# Patient Record
Sex: MALE | Race: WHITE | Employment: UNEMPLOYED | URBAN - METROPOLITAN AREA
[De-identification: names, ages, dates, MRNs, and addresses within clinical notes are randomized per-mention and may not be internally consistent; named-entity substitution may affect disease eponyms.]

---
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
## 2022-01-05 ENCOUNTER — HOSPITAL ENCOUNTER (OUTPATIENT)
Dept: SPEECH THERAPY | Facility: CLINIC | Age: 22
Setting detail: THERAPIES SERIES
Discharge: HOME | End: 2022-01-05
Payer: MEDICARE

## 2022-01-05 PROCEDURE — 92507 TX SP LANG VOICE COMM INDIV: CPT

## 2022-01-05 NOTE — VIRTUAL HEALTH
[id number] THERAPY  SPEECH THERAPY  DAILY TREATMENT NOTE    Date: 2022  Patients Name:  Libby Blake  YOB: 2000 (25 y.o.)  Gender:  male  MRN:  [id number]  Account #: [de-identified]  [facility]#: [id number]  Diagnosis: Autism F84.0  Rehab Diagnosis/Code: Language disorder NOS F80.9  Referring Provider: Gwen Elliott MD      INSURANCE  Insurance Information: [place] [employer]  Total number of visits approved: 30   Total number of visits to date:   (virtual)      Primary Language: English     Allergies: _x_yes ___ no ___NA  Type of Allergies: Seasonal     Medical Precautions: NA  If medical precautions listed, then were precautions addressed ___ yes ___no __x_NA    PAIN  [x]? No     []? Yes         Location:  N/A  Pain Rating (0-10 pain scale): N/A  Pain Description: N/A    SUBJECTIVE  Patient presents to virtual video visit with mother present. SLP in clinic. Family concerns/observations:  No new reports. GOALS/ TREATMENT SESSION:  *Goals updated from POC*  1. Patient/Caregiver will be independent with home exercise program- Ongoing  2. Pt will \"wh\" questions (novel and biographical) in 4/5x session given min cues. \"Where\" 1x given mod cues.  3/5x given min cues, increasing to 4/5x given mod cues   Age 1/5x given min cues, increasing to 5/5x given mod cues     \"What\" re: object function 3/5x given min cues, increasing to 5/5x given mod cues. 3. Pt will  label at least 2 attributes (category, function, description) for a given object in 4/5x session given min cues.    Category- 5/10x given min cues, increasing to 10/10x given mod cues   Function- 5/5x given min cues. 4. Pt will use complete sentence (pronoun, verb tense, location) to describe a picture and/or comment in 4/5x given min cues   Pt using complete sentence to answer questions 3/5x given min cues.         EDUCATION  Education provided to patient/family/caregiver:      [x]Yes/New education    [x]Yes/Continued Review of prior education   __No  If yes Education Provided: Discussed progress with  and age     Method of Education:     [x]Discussion     [x]Demonstration    [] Written     []Other  Evaluation of Patients Response to Education:         [x]Patient and or caregiver verbalized understanding  [x]Patient and or Caregiver Demonstrated without assistance   []Patient and or Caregiver Demonstrated with assistance  []Needs additional instruction to demonstrate understanding of education    ASSESSMENT  Patient tolerated todays treatment session:    [x] Good   []  Fair   []  Poor  Limitations/difficulties with treatment session due to:   []Pain     []Fatigue     []Other medical complications     []Other  Goal Assessment: [] No Change    [x]Improved  Comments: If improved/see goal: 2-4    PLAN  [x]Continue with current plan of care  [][facility]  []IHold per patient request  [] Change Treatment plan:  [] Insurance hold  __ Other    Skilled care is justified for Speech therapy to improve:  _x_ receptive language skills  _x_ expressive language skills  __ pt's ability to produce speech sounds  __ pt's ability to safely/efficiently swallow foods/liquids  __ other:     TIME   Time Treatment session was INITIATED 935am   Time Treatment session was STOPPED 10am       Total TIMED minutes 25 min    Total UNTIMED minutes 0   Total TREATMENT minutes 25 min     Charges: [id number]    Job Jiang is a 25 y.o. male being seen by a Virtual Visit (video visit) encounter to address concerns as mentioned above. A caregiver was present when appropriate.   Pursuant to the emergency declaration under the [facility], [address] [place] [address] authority and the [employer] and [employer] General Act, this Virtual Visit was conducted with patient's (and/or legal guardian's) consent, to reduce the patient's risk of exposure to COVID-19 and provide necessary medical care. The patient (and/or legal guardian) has also been advised to contact this office for worsening conditions or problems, and seek emergency medical treatment and/or call 911 if deemed necessary. Services were provided through a video synchronous discussion virtually to substitute for in-person clinic visit. Patient was located at their individual home and provider was located at the clinic. --Alexandrea White SLP on 1/5/2022 at 7:42 AM    An electronic signature was used to authenticate this note.          Electronically signed by:   Alexandrea White M.A., [address] [facility] [place]             Date:1/5/2022

## 2022-01-12 ENCOUNTER — HOSPITAL ENCOUNTER (OUTPATIENT)
Dept: SPEECH THERAPY | Facility: CLINIC | Age: 22
Setting detail: THERAPIES SERIES
Discharge: HOME | End: 2022-01-12
Payer: MEDICARE

## 2022-01-12 PROCEDURE — 92507 TX SP LANG VOICE COMM INDIV: CPT

## 2022-01-19 ENCOUNTER — HOSPITAL ENCOUNTER (OUTPATIENT)
Dept: SPEECH THERAPY | Facility: CLINIC | Age: 22
Setting detail: THERAPIES SERIES
Discharge: HOME | End: 2022-01-19
Payer: MEDICARE

## 2022-01-19 PROCEDURE — 92507 TX SP LANG VOICE COMM INDIV: CPT

## 2022-01-19 NOTE — VIRTUAL HEALTH
[id number] THERAPY  SPEECH THERAPY  DAILY TREATMENT NOTE    Date: 2022  Patients Name:  Mariella Stanton  YOB: 2000 (25 y.o.)  Gender:  male  MRN:  [id number]  Account #: [de-identified]  [facility]#: [id number]  Diagnosis: Autism F84.0  Rehab Diagnosis/Code: Language disorder NOS F80.9  Referring Provider: Drinda Phalen, MD      INSURANCE  Insurance Information: [place] [employer]age  Total number of visits approved: 30   Total number of visits to date:  3/30 (virtual)      Primary Language: English     Allergies: _x_yes ___ no ___NA  Type of Allergies: Seasonal     Medical Precautions: NA  If medical precautions listed, then were precautions addressed ___ yes ___no __x_NA    PAIN  [x]? No     []? Yes         Location:  N/A  Pain Rating (0-10 pain scale): N/A  Pain Description: N/A    SUBJECTIVE  Patient presents to virtual video visit with mother present. SLP in clinic. Family concerns/observations: Mother reports pt is improving in Brain Balance exercises. GOALS/ TREATMENT SESSION:  1. Patient/Caregiver will be independent with home exercise program- Ongoing  2. Pt will \"wh\" questions (novel and biographical) in 4/5x session given min cues. \"Where\" 4/10x given min cues, increasing to 8/10x given mod cues      2/3x given min cues, increasing to 3/3x given mod cues    Age 3/3x given min cues    \"What\" 5/10x given min cues, increasing to 10/10x given mod cues     3. Pt will  label at least 2 attributes (category, function, description) for a given object in 4/5x session given min cues.    Category-5/5x given direct model   Function- 5/10x given min cues, increasing to 5/10x given  choice of two    4. Pt will use complete sentence (pronoun, verb tense, location) to describe a picture and/or comment in 4/5x given min cues   Pt using complete to describe 2/10x given min cues, increasing to 10/10x given mod cues (visual sentence strip, expansion for locations). EDUCATION  Education provided to patient/family/caregiver:      [x]Yes/New education    [x]Yes/Continued Review of prior education   __No  If yes Education Provided: Reviewed progress father; student clinician      Method of Education:     [x]Discussion     [x]Demonstration    [] Written     []Other  Evaluation of Patients Response to Education:         [x]Patient and or caregiver verbalized understanding  [x]Patient and or Caregiver Demonstrated without assistance   []Patient and or Caregiver Demonstrated with assistance  []Needs additional instruction to demonstrate understanding of education    ASSESSMENT  Patient tolerated todays treatment session:    [x] Good   []  Fair   []  Poor  Limitations/difficulties with treatment session due to:   []Pain     []Fatigue     []Other medical complications     []Other  Goal Assessment: [] No Change    [x]Improved  Comments: If improved/see goal: 2-4    PLAN  [x]Continue with current plan of care  [][facility]  []IHold per patient request  [] Change Treatment plan:  [] Insurance hold  __ Other    Skilled care is justified for Speech therapy to improve:  _x_ receptive language skills  _x_ expressive language skills  __ pt's ability to produce speech sounds  __ pt's ability to safely/efficiently swallow foods/liquids  __ other:     TIME   Time Treatment session was INITIATED 930am   Time Treatment session was STOPPED 958am       Total TIMED minutes 28 min    Total UNTIMED minutes 0   Total TREATMENT minutes 28 min     Charges: [id number]    Alba Gilbert is a 25 y.o. male being seen by a Virtual Visit (video visit) encounter to address concerns as mentioned above. A caregiver was present when appropriate.   Pursuant to the emergency declaration under the [address], [address] waiver authority and the [employer] and [employer] Act, this Virtual Visit was conducted with patient's (and/or legal guardian's) consent, to reduce the patient's risk of exposure to COVID-19 and provide necessary medical care. The patient (and/or legal guardian) has also been advised to contact this office for worsening conditions or problems, and seek emergency medical treatment and/or call 911 if deemed necessary. Services were provided through a video synchronous discussion virtually to substitute for in-person clinic visit. Patient was located at their individual home and provider was located at the clinic. --DAVE Camarena on 1/19/2022 at 7:44 AM    An electronic signature was used to authenticate this note.          Electronically signed by:   Rufino Kern M.A., [address] [place] [address]             Date:1/19/2022

## 2022-01-26 ENCOUNTER — HOSPITAL ENCOUNTER (OUTPATIENT)
Dept: SPEECH THERAPY | Facility: CLINIC | Age: 22
Setting detail: THERAPIES SERIES
Discharge: HOME | End: 2022-01-26
Payer: MEDICARE

## 2022-01-26 PROCEDURE — 92507 TX SP LANG VOICE COMM INDIV: CPT

## 2022-01-26 NOTE — VIRTUAL HEALTH
[address] THERAPY  SPEECH THERAPY  DAILY TREATMENT NOTE    Date: 2022  Patients Name:  Maryann Dupree  YOB: 2000 (25 y.o.)  Gender:  male  MRN:  [id number]  Account #: [de-identified]  CSN#: [id number]  Diagnosis: Autism F84.0  Rehab Diagnosis/Code: Language disorder NOS F80.9  Referring Provider: Oscar Campbell MD      INSURANCE  Insurance Information: [place] [employer]age  Total number of visits approved: 30   Total number of visits to date:   (virtual)      Primary Language: English     Allergies: _x_yes ___ no ___NA  Type of Allergies: Seasonal     Medical Precautions: NA  If medical precautions listed, then were precautions addressed ___ yes ___no __x_NA    PAIN  [x]? No     []? Yes         Location:  N/A  Pain Rating (0-10 pain scale): N/A  Pain Description: N/A    SUBJECTIVE  Patient presents to virtual video visit with father present. SLP in clinic. Family concerns/observations:  Father reports is using new words that typically doesn't use. GOALS/ TREATMENT SESSION:  1. Patient/Caregiver will be independent with home exercise program- Ongoing  2. Pt will \"wh\" questions (novel and biographical) in 4/5x session given min cues.   \"Where\" 2/5x given min cues, increasing to 5/5x given mod cues      2/2x given min cues  Age 2/2x given min cues    \"What\"  Re: to object function 3/10x given min cues, increasing to 10/10x given mod cues     3. Pt will  label at least 2 attributes (category, function, description) for a given object in 4/5x session given min cues.    Category-5/10x given min cues, increasing to 10/10x given mod cues  Function- 3/10x given min cues, increasing to 5/10x given  choice of two    4. Pt will use complete sentence (pronoun, verb tense, location) to describe a picture and/or comment in 4/5x given min cues   Pt using complete to describe 5/10x given min cues, increasing to 10/10x given mod cues (visual sentence strip, expansion for locations). EDUCATION  Education provided to patient/family/caregiver:      [x]Yes/New education    [x]Yes/Continued Review of prior education   __No  If yes Education Provided: Reviewed session with mother      Method of Education:     [x]Discussion     [x]Demonstration    [] Written     []Other  Evaluation of Patients Response to Education:         [x]Patient and or caregiver verbalized understanding  [x]Patient and or Caregiver Demonstrated without assistance   []Patient and or Caregiver Demonstrated with assistance  []Needs additional instruction to demonstrate understanding of education    ASSESSMENT  Patient tolerated todays treatment session:    [x] Good   []  Fair   []  Poor  Limitations/difficulties with treatment session due to:   []Pain     []Fatigue     []Other medical complications     []Other  Goal Assessment: [] No Change    [x]Improved  Comments: If improved/see goal: 2-4    PLAN  [x]Continue with current plan of care  [][facility]  []IHold per patient request  [] Change Treatment plan:  [] Insurance hold  __ Other    Skilled care is justified for Speech therapy to improve:  _x_ receptive language skills  _x_ expressive language skills  __ pt's ability to produce speech sounds  __ pt's ability to safely/efficiently swallow foods/liquids  __ other:     TIME   Time Treatment session was INITIATED 930am   Time Treatment session was STOPPED 10am       Total TIMED minutes 30 min    Total UNTIMED minutes 0   Total TREATMENT minutes 30 min     Charges: [id number]    Cee Rodriguez is a 25 y.o. male being seen by a Virtual Visit (video visit) encounter to address concerns as mentioned above. A caregiver was present when appropriate.   Pursuant to the emergency declaration under the [facility] [address] waiver authority and the [employer] and [employer] General Act, this Virtual Visit was conducted with patient's (and/or legal guardian's) consent, to reduce the patient's risk of exposure to COVID-19 and provide necessary medical care. The patient (and/or legal guardian) has also been advised to contact this office for worsening conditions or problems, and seek emergency medical treatment and/or call 911 if deemed necessary. Services were provided through a video synchronous discussion virtually to substitute for in-person clinic visit. Patient was located at their individual home and provider was located at the clinic. --DAVE Dent on 1/26/2022 at 8:00 AM    An electronic signature was used to authenticate this note.          Electronically signed by:   Nicole Buchanan M.A., [address] [place] [address]             Date:1/26/2022

## 2022-02-02 ENCOUNTER — HOSPITAL ENCOUNTER (OUTPATIENT)
Dept: SPEECH THERAPY | Facility: CLINIC | Age: 22
Setting detail: THERAPIES SERIES
Discharge: HOME | End: 2022-02-02
Payer: MEDICARE

## 2022-02-02 PROCEDURE — 92507 TX SP LANG VOICE COMM INDIV: CPT

## 2022-02-02 NOTE — VIRTUAL HEALTH
[address] THERAPY  SPEECH THERAPY  DAILY TREATMENT NOTE    Date: 2022  Patients Name:  Elizabeth Zheng  YOB: 2000 (25 y.o.)  Gender:  male  MRN:  [id number]  Account #: [de-identified]  [facility]#: [id number]  Diagnosis: Autism F84.0  Rehab Diagnosis/Code: Language disorder NOS F80.9  Referring Provider: Cj Coyle MD      INSURANCE  Insurance Information: [employer]  Total number of visits approved: 30   Total number of visits to date:   (virtual)      Primary Language: English     Allergies: _x_yes ___ no ___NA  Type of Allergies: Seasonal     Medical Precautions: NA  If medical precautions listed, then were precautions addressed ___ yes ___no __x_NA    PAIN  [x]? No     []? Yes         Location:  N/A  Pain Rating (0-10 pain scale): N/A  Pain Description: N/A    SUBJECTIVE  Patient presents to virtual video visit with mother present. SLP in clinic. Family concerns/observations:  No new reports. GOALS/ TREATMENT SESSION:  1. Patient/Caregiver will be independent with home exercise program- Ongoing  2. Pt will \"wh\" questions (novel and biographical) in 4/5x session given min cues. \"Where\" 1/10x given min cues, increasing to 10/10x given mod cues      3/3x given min cues  Age 3/3x given min cues    \"What\"  Re: to object function 8/10x given min cues, increasing to 10/10x given mod cues     3. Pt will  label at least 2 attributes (category, function, description) for a given object in 4/5x session given min cues.    Category-2/10x given min cues, increasing to 8/10x given mod cues  Function- 8/10x given min cues, increasing to 10/10x given  choice of two    4. Pt will use complete sentence (pronoun, verb tense, location) to describe a picture and/or comment in 4/5x given min cues   Pt using complete to describe 4/10x given min cues, increasing to 10/10x given mod cues (visual sentence strip, expansion for locations).         EDUCATION  Education provided to patient/family/caregiver:      [x]Yes/New education    [x]Yes/Continued Review of prior education   __No  If yes Education Provided: Reviewed session with father      Method of Education:     [x]Discussion     [x]Demonstration    [] Written     []Other  Evaluation of Patients Response to Education:         [x]Patient and or caregiver verbalized understanding  [x]Patient and or Caregiver Demonstrated without assistance   []Patient and or Caregiver Demonstrated with assistance  []Needs additional instruction to demonstrate understanding of education    ASSESSMENT  Patient tolerated todays treatment session:    [x] Good   []  Fair   []  Poor  Limitations/difficulties with treatment session due to:   []Pain     []Fatigue     []Other medical complications     []Other  Goal Assessment: [] No Change    [x]Improved  Comments: If improved/see goal: 2-4    PLAN  [x]Continue with current plan of care  [][facility]  []IHold per patient request  [] Change Treatment plan:  [] Insurance hold  __ Other    Skilled care is justified for Speech therapy to improve:  _x_ receptive language skills  _x_ expressive language skills  __ pt's ability to produce speech sounds  __ pt's ability to safely/efficiently swallow foods/liquids  __ other:     TIME   Time Treatment session was INITIATED 930am   Time Treatment session was STOPPED 10am       Total TIMED minutes 30 min    Total UNTIMED minutes 0   Total TREATMENT minutes 30 min     Charges: [id number]    Fernando Cedillo is a 25 y.o. male being seen by a Virtual Visit (video visit) encounter to address concerns as mentioned above. A caregiver was present when appropriate.   Pursuant to the emergency declaration under the [facility], [address] waiver authority and the [employer] and [employer] Act, this Virtual Visit was conducted with patient's (and/or legal guardian's) consent, to reduce the patient's risk of exposure to COVID-19 and provide necessary medical care. The patient (and/or legal guardian) has also been advised to contact this office for worsening conditions or problems, and seek emergency medical treatment and/or call 911 if deemed necessary. Services were provided through a video synchronous discussion virtually to substitute for in-person clinic visit. Patient was located at their individual home and provider was located at the clinic. --DAVE Dominguez on 2/2/2022 at 7:34 AM    An electronic signature was used to authenticate this note.          Electronically signed by:   Sherman Moreira M.A., [address] [facility]             Date:2/2/2022

## 2022-02-09 ENCOUNTER — HOSPITAL ENCOUNTER (OUTPATIENT)
Dept: SPEECH THERAPY | Facility: CLINIC | Age: 22
Setting detail: THERAPIES SERIES
Discharge: HOME | End: 2022-02-09
Payer: MEDICARE

## 2022-02-09 PROCEDURE — 92507 TX SP LANG VOICE COMM INDIV: CPT

## 2022-02-09 NOTE — VIRTUAL HEALTH
[address] THERAPY  SPEECH THERAPY  DAILY TREATMENT NOTE    Date: 2022  Patients Name:  Mariella Stanton  YOB: 2000 (25 y.o.)  Gender:  male  MRN:  [id number]  Account #: [de-identified]  [facility]#: [id number]  Diagnosis: Autism F84.0  Rehab Diagnosis/Code: Language disorder NOS F80.9  Referring Provider: Drinda Phalen, MD      INSURANCE  Insurance Information: [employer]  Total number of visits approved: 30   Total number of visits to date:   (virtual)      Primary Language: English     Allergies: _x_yes ___ no ___NA  Type of Allergies: Seasonal     Medical Precautions: NA  If medical precautions listed, then were precautions addressed ___ yes ___no __x_NA    PAIN  [x]? No     []? Yes         Location:  N/A  Pain Rating (0-10 pain scale): N/A  Pain Description: N/A    SUBJECTIVE  Patient presents to virtual video visit with mother present. SLP in clinic. Student clinician in session. Family concerns/observations: Mother has no new reports. GOALS/ TREATMENT SESSION:  1. Patient/Caregiver will be independent with home exercise program- Ongoing  2. Pt will \"wh\" questions (novel and biographical) in 4/5x session given min cues. \"Where\" 4/5x given mod cues.  4/4x given min cues  Age 4/4x given min cues  Address  4/4x given direct model (visual and verbal)      3. Pt will  label at least 2 attributes (category, function, description) for a given object in 4/5x session given min cues.    Category- 6/10x given min cues, increasing to 10/10x given mod cues (fruit, vegetable, food, drink)     4. Pt will use complete sentence (pronoun, verb tense, location) to describe a picture and/or comment in 4/5x given min cues   Pt using complete to describe 1/10x given min cues, increasing to 10/10x given mod cues (visual sentence strip, expansion for locations).    *Difficulty with pronouns this session; increase in use of action words and expanding sentences with objects and locations     Pt responding to questions re: to biographical information (, age) using a complete sentence 2/6x given verbal prompt, increasing to 6/6x given model and visual sentence strip         EDUCATION  Education provided to patient/family/caregiver:      [x]Yes/New education    [x]Yes/Continued Review of prior education   __No  If yes Education Provided: Reviewed session with mother     Method of Education:     [x]Discussion     [x]Demonstration    [] Written     []Other  Evaluation of Patients Response to Education:         [x]Patient and or caregiver verbalized understanding  [x]Patient and or Caregiver Demonstrated without assistance   []Patient and or Caregiver Demonstrated with assistance  []Needs additional instruction to demonstrate understanding of education    ASSESSMENT  Patient tolerated todays treatment session:    [x] Good   []  Fair   []  Poor  Limitations/difficulties with treatment session due to:   []Pain     []Fatigue     []Other medical complications     []Other  Goal Assessment: [] No Change    [x]Improved  Comments: If improved/see goal: 2-4    PLAN  [x]Continue with current plan of care  [][facility]  []IHold per patient request  [] Change Treatment plan:  [] Insurance hold  __ Other    Skilled care is justified for Speech therapy to improve:  _x_ receptive language skills  _x_ expressive language skills  __ pt's ability to produce speech sounds  __ pt's ability to safely/efficiently swallow foods/liquids  __ other:     TIME   Time Treatment session was INITIATED 938am   Time Treatment session was STOPPED 1008am       Total TIMED minutes 30 min    Total UNTIMED minutes 0   Total TREATMENT minutes 30 min     Charges: [id number]    Andree Chan is a 25 y.o. male being seen by a Virtual Visit (video visit) encounter to address concerns as mentioned above. A caregiver was present when appropriate.   Pursuant to the emergency declaration under the [address] and the [employer] Act, [facility] waiver authority and the Coronavirus Preparedness and [employer] Act, this Virtual Visit was conducted with patient's (and/or legal guardian's) consent, to reduce the patient's risk of exposure to COVID-19 and provide necessary medical care. The patient (and/or legal guardian) has also been advised to contact this office for worsening conditions or problems, and seek emergency medical treatment and/or call 911 if deemed necessary. Services were provided through a video synchronous discussion virtually to substitute for in-person clinic visit. Patient was located at their individual home and provider was located at the clinic. --DAVE Calvillo on 2/9/2022 at 9:16 AM    An electronic signature was used to authenticate this note.          Electronically signed by:   Waynetta Perks M.A., Magdalen Epley             Date:2/9/2022

## 2022-02-16 ENCOUNTER — HOSPITAL ENCOUNTER (OUTPATIENT)
Dept: SPEECH THERAPY | Facility: CLINIC | Age: 22
Setting detail: THERAPIES SERIES
Discharge: HOME | End: 2022-02-16
Payer: MEDICARE

## 2022-02-16 PROCEDURE — 92507 TX SP LANG VOICE COMM INDIV: CPT

## 2022-02-16 NOTE — VIRTUAL HEALTH
[address] THERAPY  SPEECH THERAPY  DAILY TREATMENT NOTE    Date: 2022  Patients Name:  Andree Chan  YOB: 2000 (25 y.o.)  Gender:  male  MRN:  [id number]  Account #: [de-identified]  [facility]#: [id number]  Diagnosis: Autism F84.0  Rehab Diagnosis/Code: Language disorder NOS F80.9  Referring Provider: Gwen Chen MD      INSURANCE  Insurance Information: [employer]  Total number of visits approved: 30   Total number of visits to date:   (virtual)      Primary Language: English     Allergies: _x_yes ___ no ___NA  Type of Allergies: Seasonal     Medical Precautions: NA  If medical precautions listed, then were precautions addressed ___ yes ___no __x_NA    PAIN  [x]? No     []? Yes         Location:  N/A  Pain Rating (0-10 pain scale): N/A  Pain Description: N/A    SUBJECTIVE  Patient presents to virtual video visit with mother present. SLP in clinic. Student clinician in session. Family concerns/observations: Mother reports pt doing mouth exercises with [employer]-increased saliva production involuntary. GOALS/ TREATMENT SESSION:  1. Patient/Caregiver will be independent with home exercise program- Ongoing  2. Pt will \"wh\" questions (novel and biographical) in 4/5x session given min cues.      4/4x given min cues  Age 3/4x given min cues  Address  1/4x given min cues, 4/4x given direct model       3. Pt will  label at least 2 attributes (category, function, description) for a given object in 4/5x session given min cues.      Category-  4/10x given min cues, increasing to 10/10x given mod cues (fruit, vegetable, food, clothing)   Function-  2/5x given min cues, increasing to 5/5x given mod cues (household items)    4. Pt will use complete sentence (pronoun, verb tense, location) to describe a picture and/or comment in 4/5x given min cues   Pt using complete to describe 6/10x given min cues, increasing to 10/10x given mod cues (visual sentence strip, expansion for locations). *Difficulty with pronouns this session; increase in use of action words and expanding sentences with objects and locations     Pt responding to questions re: to biographical information (, age) using a complete sentence 2/5x given verbal prompt, increasing to 5/5x given model and visual sentence strip         EDUCATION  Education provided to patient/family/caregiver:      [x]Yes/New education    [x]Yes/Continued Review of prior education   __No  If yes Education Provided: Reviewed session with mother     Method of Education:     [x]Discussion     [x]Demonstration    [] Written     []Other  Evaluation of Patients Response to Education:         [x]Patient and or caregiver verbalized understanding  [x]Patient and or Caregiver Demonstrated without assistance   []Patient and or Caregiver Demonstrated with assistance  []Needs additional instruction to demonstrate understanding of education    ASSESSMENT  Patient tolerated todays treatment session:    [x] Good   []  Fair   []  Poor  Limitations/difficulties with treatment session due to:   []Pain     []Fatigue     []Other medical complications     []Other  Goal Assessment: [] No Change    [x]Improved  Comments:  If improved/see goal: 2-4    PLAN  [x]Continue with current plan of care  [][facility]  []IHold per patient request  [] Change Treatment plan:  [] Insurance hold  __ Other    Skilled care is justified for Speech therapy to improve:  _x_ receptive language skills  _x_ expressive language skills  __ pt's ability to produce speech sounds  __ pt's ability to safely/efficiently swallow foods/liquids  __ other:     TIME   Time Treatment session was INITIATED 935am   Time Treatment session was STOPPED 1002am       Total TIMED minutes 25 min    Total UNTIMED minutes 0   Total TREATMENT minutes 25 min     Charges: [id number]    Zohreh Boothe is a 25 y.o. male being seen by a Virtual Visit (video visit) encounter to address concerns as mentioned above. A caregiver was present when appropriate. Pursuant to the emergency declaration under the [facility] [address] and the [employer] and [employer] Act, this Virtual Visit was conducted with patient's (and/or legal guardian's) consent, to reduce the patient's risk of exposure to COVID-19 and provide necessary medical care. The patient (and/or legal guardian) has also been advised to contact this office for worsening conditions or problems, and seek emergency medical treatment and/or call 911 if deemed necessary. Services were provided through a video synchronous discussion virtually to substitute for in-person clinic visit. Patient was located at their individual home and provider was located at the clinic. --DAVE Pérez on 2/16/2022 at 7:45 AM    An electronic signature was used to authenticate this note.          Electronically signed by:   Nii Vaughan M.A., [address] [place] [address]             BMQQ:3/43/6000

## 2022-02-23 ENCOUNTER — HOSPITAL ENCOUNTER (OUTPATIENT)
Dept: SPEECH THERAPY | Facility: CLINIC | Age: 22
Setting detail: THERAPIES SERIES
Discharge: HOME | End: 2022-02-23
Payer: MEDICARE

## 2022-02-23 PROCEDURE — 92507 TX SP LANG VOICE COMM INDIV: CPT

## 2022-02-23 NOTE — VIRTUAL HEALTH
[address] THERAPY  SPEECH THERAPY  DAILY TREATMENT NOTE    Date: 2022  Patients Name:  Delfin Maher  YOB: 2000 (25 y.o.)  Gender:  male  MRN:  [id number]  Account #: [de-identified]  CSN#: [id number]  Diagnosis: Autism F84.0  Rehab Diagnosis/Code: Language disorder NOS F80.9  Referring Provider: Bartolo Cabrera MD      INSURANCE  Insurance Information: [employer]  Total number of visits approved: 30   Total number of visits to date:   (virtual)      Primary Language: English     Allergies: _x_yes ___ no ___NA  Type of Allergies: Seasonal     Medical Precautions: NA  If medical precautions listed, then were precautions addressed ___ yes ___no __x_NA    PAIN  [x]? No     []? Yes         Location:  N/A  Pain Rating (0-10 pain scale): N/A  Pain Description: N/A    SUBJECTIVE  Patient presents to virtual video visit with mother present. SLP in clinic. Student clinician in session. Family concerns/observations: Mother reports continues to have difficulty with signing onto [employer]- completing paperwork. GOALS/ TREATMENT SESSION:  1. Patient/Caregiver will be independent with home exercise program- Ongoing  2. Pt will \"wh\" questions (novel and biographical) in 4/5x session given min cues.   2/2x given min cues  Age 2/3x given min cues  Address  2/3x given min cues      3. Pt will  label at least 2 attributes (category, function, description) for a given object in 4/5x session given min cues.      Category- 3/10x given min cues, increasing to 10/10x given mod cues (drink, instruments, food, tools)  Function-  5/5x given min cues (familiar objects-food/fruit/tools/instruments)    4. Pt will use complete sentence (pronoun, verb tense, location) to describe a picture and/or comment in 4/5x given min cues   Pt using complete to describe 8/10x given mod cues , increasing to 10/10x given mod cues (visual sentence strip, expansion for locations). *Difficulty with pronouns this session; increase in use of action words and expanding sentences with objects and locations     Pt responding to questions re: to biographical information (, age) using a complete sentence 1/5x given verbal prompt, increasing to 4/5x given model and visual sentence strip         EDUCATION  Education provided to patient/family/caregiver:      [x]Yes/New education    [x]Yes/Continued Review of prior education   __No  If yes Education Provided: Reviewed session with mother     Method of Education:     [x]Discussion     [x]Demonstration    [] Written     []Other  Evaluation of Patients Response to Education:         [x]Patient and or caregiver verbalized understanding  [x]Patient and or Caregiver Demonstrated without assistance   []Patient and or Caregiver Demonstrated with assistance  []Needs additional instruction to demonstrate understanding of education    ASSESSMENT  Patient tolerated todays treatment session:    [x] Good   []  Fair   []  Poor  Limitations/difficulties with treatment session due to:   []Pain     []Fatigue     []Other medical complications     []Other  Goal Assessment: [] No Change    [x]Improved  Comments: If improved/see goal: 2-4    PLAN  [x]Continue with current plan of care  [][facility]  []IHold per patient request  [] Change Treatment plan:  [] Insurance hold  __ Other    Skilled care is justified for Speech therapy to improve:  _x_ receptive language skills  _x_ expressive language skills  __ pt's ability to produce speech sounds  __ pt's ability to safely/efficiently swallow foods/liquids  __ other:     TIME   Time Treatment session was INITIATED 930am   Time Treatment session was STOPPED 10am       Total TIMED minutes 30 min    Total UNTIMED minutes 0   Total TREATMENT minutes 30 min     Charges: [id number]    Xi Funes is a 25 y.o. male being seen by a Virtual Visit (video visit) encounter to address concerns as mentioned above.   A

## 2022-12-15 ENCOUNTER — TELEPHONE (OUTPATIENT)
Dept: FAMILY MEDICINE CLINIC | Age: 22
End: 2022-12-15

## 2022-12-15 DIAGNOSIS — F80.1 EXPRESSIVE LANGUAGE DISORDER: Primary | ICD-10-CM

## 2022-12-15 NOTE — TELEPHONE ENCOUNTER
Mom is requesting an order for speech therapy that states eval and treat for expressive language disorder F80.1, code must on order [phone] fax attn Grace Ruffin. [facility].

## 2023-01-03 ENCOUNTER — TELEMEDICINE (OUTPATIENT)
Dept: FAMILY MEDICINE CLINIC | Age: 23
End: 2023-01-03
Payer: COMMERCIAL

## 2023-01-03 DIAGNOSIS — F84.0 ACTIVE AUTISTIC DISORDER: Primary | ICD-10-CM

## 2023-01-03 DIAGNOSIS — F80.1 EXPRESSIVE LANGUAGE DISORDER: ICD-10-CM

## 2023-01-03 DIAGNOSIS — R62.50 DEVELOPMENTAL DELAY: ICD-10-CM

## 2023-01-03 PROCEDURE — G8427 DOCREV CUR MEDS BY ELIG CLIN: HCPCS | Performed by: FAMILY MEDICINE

## 2023-01-03 PROCEDURE — 99214 OFFICE O/P EST MOD 30 MIN: CPT | Performed by: FAMILY MEDICINE

## 2023-01-03 ASSESSMENT — ENCOUNTER SYMPTOMS
GASTROINTESTINAL NEGATIVE: 1
ALLERGIC/IMMUNOLOGIC NEGATIVE: 1
RESPIRATORY NEGATIVE: 1
EYES NEGATIVE: 1

## 2023-01-03 NOTE — PROGRESS NOTES
Kash Knight (:  2000) is a Established patient, here for evaluation of the following:    Assessment & Plan   Below is the assessment and plan developed based on review of pertinent history, physical exam, labs, studies, and medications. 1. Active autistic disorder  Assessment & Plan:   Stable  Working with speech therapy and needs new referral  Mom giving HPI and ROS  Inquires about SSI - advised to get previous records from provider and I will refer to psych for specialist eval and they will fill out paperwork  2. Developmental delay  Assessment & Plan:   Stable  3. Expressive language disorder  Assessment & Plan:   Monitored by specialist- no acute findings meriting change in the plan  Orders:  -     External Referral To Speech Therapy    No follow-ups on file. Gonzalo Swann's mother helping with HPI and Constanza Solano has autism and developmental delay and speech delay - working with speech therapy - used to go to specialized clinic but has aged out and needs referral to new specialist they have info. Wants SSI but doesn't know how to get it started/done    Review of Systems   Constitutional: Negative. HENT: Negative. Eyes: Negative. Respiratory: Negative. Cardiovascular: Negative. Gastrointestinal: Negative. Endocrine: Negative. Genitourinary: Negative. Musculoskeletal: Negative. Skin: Negative. Allergic/Immunologic: Negative. Neurological: Negative. Hematological: Negative. Psychiatric/Behavioral: Negative. All other systems reviewed and are negative.        Objective   Patient-Reported Vitals  No data recorded     Physical Exam  [INSTRUCTIONS:  \"[x]\" Indicates a positive item  \"[]\" Indicates a negative item  -- DELETE ALL ITEMS NOT EXAMINED]    Constitutional: [x] Appears well-developed and well-nourished [x] No apparent distress      [] Abnormal -     Mental status: [x] Alert and awake  [x] Oriented to person/place/time [x] Able to follow commands    [] Abnormal -     Eyes:   EOM    [x]  Normal    [] Abnormal -   Sclera  [x]  Normal    [] Abnormal -          Discharge [x]  None visible   [] Abnormal -     HENT: [x] Normocephalic, atraumatic  [] Abnormal -   [x] Mouth/Throat: Mucous membranes are moist    External Ears [x] Normal  [] Abnormal -    Neck: [x] No visualized mass [] Abnormal -     Pulmonary/Chest: [x] Respiratory effort normal   [x] No visualized signs of difficulty breathing or respiratory distress        [] Abnormal -      Musculoskeletal:   [x] Normal gait with no signs of ataxia         [x] Normal range of motion of neck        [] Abnormal -     Neurological:        [x] No Facial Asymmetry (Cranial nerve 7 motor function) (limited exam due to video visit)          [x] No gaze palsy        [] Abnormal -          Skin:        [x] No significant exanthematous lesions or discoloration noted on facial skin         [] Abnormal -            Psychiatric:       [] Normal Affect [x] Abnormal - mostly nonverbal       [x] No Hallucinations    Other pertinent observable physical exam findings:-         On this date 1/3/2023 I have spent 31 minutes reviewing previous notes, test results and face to face (virtual) with the patient discussing the diagnosis and importance of compliance with the treatment plan as well as documenting on the day of the visitRandall Madelyn Beckerjaime, was evaluated through a synchronous (real-time) audio-video encounter. The patient (or guardian if applicable) is aware that this is a billable service, which includes applicable co-pays. This Virtual Visit was conducted with patient's (and/or legal guardian's) consent. The visit was conducted pursuant to the emergency declaration under the [address] [place] [address] authority and the [employer] and [employer] Act. Patient identification was verified, and a caregiver was present when appropriate. The patient was located at Home: [address] [place] [id number].    Provider was located at [facility] ([place] [address]): [address]

## 2023-01-03 NOTE — ASSESSMENT & PLAN NOTE
Stable  Working with speech therapy and needs new referral  Mom giving HPI and ROS  Inquires about SSI - advised to get previous records from provider and I will refer to psych for specialist eval and they will fill out paperwork

## 2023-01-03 NOTE — Clinical Note
Please fax the speech therapy order to [phone] Jalen Dent at [facility]

## 2024-03-15 ENCOUNTER — OFFICE VISIT (OUTPATIENT)
Dept: FAMILY MEDICINE CLINIC | Age: 24
End: 2024-03-15
Payer: MEDICAID

## 2024-03-15 VITALS — WEIGHT: 121 LBS | DIASTOLIC BLOOD PRESSURE: 70 MMHG | HEART RATE: 66 BPM | SYSTOLIC BLOOD PRESSURE: 112 MMHG

## 2024-03-15 DIAGNOSIS — H53.8 BLURRY VISION: ICD-10-CM

## 2024-03-15 DIAGNOSIS — T78.40XD ALLERGY, SUBSEQUENT ENCOUNTER: ICD-10-CM

## 2024-03-15 DIAGNOSIS — Z00.00 ENCOUNTER FOR WELL ADULT EXAM WITHOUT ABNORMAL FINDINGS: Primary | ICD-10-CM

## 2024-03-15 PROCEDURE — 99395 PREV VISIT EST AGE 18-39: CPT | Performed by: FAMILY MEDICINE

## 2024-03-15 SDOH — ECONOMIC STABILITY: INCOME INSECURITY: HOW HARD IS IT FOR YOU TO PAY FOR THE VERY BASICS LIKE FOOD, HOUSING, MEDICAL CARE, AND HEATING?: NOT HARD AT ALL

## 2024-03-15 SDOH — ECONOMIC STABILITY: FOOD INSECURITY: WITHIN THE PAST 12 MONTHS, THE FOOD YOU BOUGHT JUST DIDN'T LAST AND YOU DIDN'T HAVE MONEY TO GET MORE.: NEVER TRUE

## 2024-03-15 SDOH — ECONOMIC STABILITY: FOOD INSECURITY: WITHIN THE PAST 12 MONTHS, YOU WORRIED THAT YOUR FOOD WOULD RUN OUT BEFORE YOU GOT MONEY TO BUY MORE.: NEVER TRUE

## 2024-03-15 SDOH — ECONOMIC STABILITY: HOUSING INSECURITY
IN THE LAST 12 MONTHS, WAS THERE A TIME WHEN YOU DID NOT HAVE A STEADY PLACE TO SLEEP OR SLEPT IN A SHELTER (INCLUDING NOW)?: NO

## 2024-03-15 ASSESSMENT — ENCOUNTER SYMPTOMS
RESPIRATORY NEGATIVE: 1
EYES NEGATIVE: 1
GASTROINTESTINAL NEGATIVE: 1
ALLERGIC/IMMUNOLOGIC NEGATIVE: 1

## 2024-03-15 ASSESSMENT — PATIENT HEALTH QUESTIONNAIRE - PHQ9
SUM OF ALL RESPONSES TO PHQ QUESTIONS 1-9: 0
1. LITTLE INTEREST OR PLEASURE IN DOING THINGS: 0
SUM OF ALL RESPONSES TO PHQ QUESTIONS 1-9: 0

## 2024-03-15 NOTE — PROGRESS NOTES
Well Adult Note  Name: Shree Rodriguez Today’s Date: 3/15/2024   MRN: [id number] Sex: Male   Age: 23 y.o. Ethnicity: Non- / Non    : 2000 Race: White (non-)      Shree Rodriguez is here for well adult exam.  History:  Shree Rodriguez presents today for an annual physical.    Sister here helping with HPI and ROS since Shree is developmentally disabed    Diet/Nutrition - in general, a \"healthy\" diet   does lots of juices  Exercise/Activity - frequently  treadmill at home - has to do movement to get energy out  Sleep Habits - normal  Diabetes - None   Cardiovascular Health - Hypertension?  no Hyperlipidemia? no   Hearing - normal to conversation  Vision - needs to see optometrist  Tobacco/Smoking - Smoker? non-smoker Vape? No Smokeless? No   Alcohol - none  Illicit Drugs - no history of illicit drug use  Sexual Habits - not sexually active  Skin Cancer - Fair skin/previous sun exposure? Yes Suspicious lesion? no  Depression - denies  Anxiety - denies      Review of Systems   Constitutional: Negative.    HENT: Negative.     Eyes: Negative.    Respiratory: Negative.     Cardiovascular: Negative.    Gastrointestinal: Negative.    Endocrine: Negative.    Genitourinary: Negative.    Musculoskeletal: Negative.    Skin: Negative.    Allergic/Immunologic: Negative.    Neurological: Negative.    Hematological: Negative.    Psychiatric/Behavioral: Negative.     All other systems reviewed and are negative.      Allergies   Allergen Reactions    Seasonal          Prior to Visit Medications    Not on File         Past Medical History:   Diagnosis Date    Autism        No past surgical history on file.      Family History   Problem Relation Age of Onset    Cancer Mother        Social History     Tobacco Use    Smoking status: Never    Smokeless tobacco: Never   Substance Use Topics    Alcohol use: Never    Drug use: Never       Objective     Vital Signs  /70   Pulse 66   Wt 54.9 kg (121

## 2024-03-15 NOTE — PATIENT INSTRUCTIONS
ways to manage stress.     If you're feeling depressed or hopeless, talk to someone. A counselor can help. If you don't have a counselor, talk to your doctor.   Talk to your doctor if you think you may have a problem with alcohol or drug use. This includes prescription medicines, marijuana, and other drugs.     Avoid tobacco and nicotine: Don't smoke, vape, or chew. If you need help quitting, talk to your doctor.   Practice safer sex. Getting tested, using condoms or dental dams, and limiting sex partners can help prevent STIs.     Use birth control if it's important to you to prevent pregnancy. Talk with your doctor about your choices and what might be best for you.   Prevent problems where you can. Protect your skin from too much sun, wash your hands, brush your teeth twice a day, and wear a seat belt in the car.   Where can you learn more?  Go to https://www.[employer].net/patientEd and enter P072 to learn more about \"Well Visit, Ages 18 to 65: Care Instructions.\"  Current as of: August 6, 2023               Content Version: 14.0  © 2006-2024 [employer].   Care instructions adapted under license by [employer]. If you have questions about a medical condition or this instruction, always ask your healthcare professional. [employer] disclaims any warranty or liability for your use of this information.

## 2024-04-08 NOTE — VIRTUAL HEALTH
[id number] [address] THERAPY  SPEECH THERAPY  DAILY TREATMENT NOTE    Date: 2022  Patients Name:  Charan Belcher  YOB: 2000 (25 y.o.)  Gender:  male  MRN:  [id number]  Account #: [de-identified]  CSN#: [id number]  Diagnosis: Autism F84.0  Rehab Diagnosis/Code: Language disorder NOS F80.9  Referring Provider: Airam Miguel MD      INSURANCE  Insurance Information: [employer]  Total number of visits approved: 30   Total number of visits to date:   (virtual)      Primary Language: English     Allergies: _x_yes ___ no ___NA  Type of Allergies: Seasonal     Medical Precautions: NA  If medical precautions listed, then were precautions addressed ___ yes ___no __x_NA    PAIN  [x]? No     []? Yes         Location:  N/A  Pain Rating (0-10 pain scale): N/A  Pain Description: N/A    SUBJECTIVE  Patient presents to virtual video visit with mother present. SLP in clinic. Family concerns/observations: Mother reports pt is doing Brain Balance exercises 3x per week. GOALS/ TREATMENT SESSION:  *Goals updated from POC*  1. Patient/Caregiver will be independent with home exercise program- Ongoing  2. Pt will \"wh\" questions (novel and biographical) in 4/5x session given min cues. \"Where\" 1/5x given min cues, increasing to 5/5x given mod cues      5/5x given mod cues   Age 3/3x given min cues    \"What\" re: object function 2/5x given min cues, increasing to 5/5x given mod cues.      3. Pt will  label at least 2 attributes (category, function, description) for a given object in 4/5x session given min cues.    Category- 5/10x given min cues, increasing to 10/10x given mod cues   Function- 2/5x given min cues, increasing to 5/5x given  choice of two    4. Pt will use complete sentence (pronoun, verb tense, location) to describe a picture and/or comment in 4/5x given min cues   NA this date        EDUCATION  Education provided to patient/family/caregiver:      [x]Yes/New Medication history in progress. Medication history is complete. Medication list updated in Outpatient Medication Record (OMR) per [facility]. All medications recently dispensed (March 2024 x 60-day supply). No dispensing history for levothyroxine. Unable to reach emergency contact.  Please call spectra [phone] if you have any questions.  education    [x]Yes/Continued Review of prior education   __No  If yes Education Provided: Reviewed progress with father      Method of Education:     [x]Discussion     [x]Demonstration    [] Written     []Other  Evaluation of Patients Response to Education:         [x]Patient and or caregiver verbalized understanding  [x]Patient and or Caregiver Demonstrated without assistance   []Patient and or Caregiver Demonstrated with assistance  []Needs additional instruction to demonstrate understanding of education    ASSESSMENT  Patient tolerated todays treatment session:    [x] Good   []  Fair   []  Poor  Limitations/difficulties with treatment session due to:   []Pain     []Fatigue     []Other medical complications     []Other  Goal Assessment: [] No Change    [x]Improved  Comments: If improved/see goal: 2-4    PLAN  [x]Continue with current plan of care  [][facility]  []IHold per patient request  [] Change Treatment plan:  [] Insurance hold  __ Other    Skilled care is justified for Speech therapy to improve:  _x_ receptive language skills  _x_ expressive language skills  __ pt's ability to produce speech sounds  __ pt's ability to safely/efficiently swallow foods/liquids  __ other:     TIME   Time Treatment session was INITIATED 935am   Time Treatment session was STOPPED 10am       Total TIMED minutes 25 min    Total UNTIMED minutes 0   Total TREATMENT minutes 25 min     Charges: [id number]    Lalita Carcamo is a 25 y.o. male being seen by a Virtual Visit (video visit) encounter to address concerns as mentioned above. A caregiver was present when appropriate.   Pursuant to the emergency declaration under the [facility], [address] [place] [address] authority and the [employer] Act, this Virtual Visit was conducted with patient's (and/or legal guardian's) consent, to reduce the patient's risk of exposure to COVID-19 and provide necessary medical care. The patient (and/or legal guardian) has also been advised to contact this office for worsening conditions or problems, and seek emergency medical treatment and/or call 911 if deemed necessary. Services were provided through a video synchronous discussion virtually to substitute for in-person clinic visit. Patient was located at their individual home and provider was located at the clinic. --Leny Urias SLP on 1/12/2022 at 7:41 AM    An electronic signature was used to authenticate this note.          Electronically signed by:   Leny Urias M.A., [address] [place] [address]             Date:1/12/2022 Medication history is incomplete. Medication list updated in Outpatient Medication Record (OMR) per [facility]. All medications recently dispensed (March 2024 x 60-day supply). No dispensing history for levothyroxine. Unable to reach emergency contact.  Please call spectra [phone] if you have any questions.  Medication history is incomplete. Medication list updated in Outpatient Medication Record (OMR) per [facility]. All medications recently dispensed (March 2024 x 60-day supply). No dispensing history for levothyroxine. Unable to verify patient's medication list with two sources. Patient/patient's mother unable to verify entire medication list.  ·	Patient's own medications list:   ·	Insulin glargine 25-30 units qAM (however patient/mother state not taking)  ·	glimepiride 2 mg daily (per doctor's office [phone], glimepiride stopped  ·	Rybelsus 14 mg daily  ·	metformin 1000 mG BID  Please call spectra [phone] if you have any questions.  Medication history is incomplete. Medication list updated in Outpatient Medication Record (OMR) per [facility]. Unable to verify patient's medication list with two sources. Patient/patient's mother unable to verify entire medication list.  Please note:  ·	No dispensing history for levothyroxine  ·	Insulin glargine last dispensed 3/29/24 x 30-day supply (patient/mother state not taking)  ·	glimepiride last dispensed 2/28/24 x 30-day supply (stopped per prescribing doctor's office [phone])  ·	Rybelsus 14 mg daily - originally ordered 11/11/23; 5th refill dispensed 4/5/24  Please call spectra [phone] if you have any questions.   S  in Vasu Chatman ID# [id number]

## 2025-01-03 ENCOUNTER — OFFICE VISIT (OUTPATIENT)
Dept: FAMILY MEDICINE CLINIC | Age: 25
End: 2025-01-03
Payer: MEDICAID

## 2025-01-03 VITALS
HEART RATE: 65 BPM | SYSTOLIC BLOOD PRESSURE: 98 MMHG | BODY MASS INDEX: 19.58 KG/M2 | DIASTOLIC BLOOD PRESSURE: 60 MMHG | TEMPERATURE: 98.1 F | OXYGEN SATURATION: 99 % | HEIGHT: 66 IN | RESPIRATION RATE: 16 BRPM | WEIGHT: 121.8 LBS

## 2025-01-03 DIAGNOSIS — F80.1 EXPRESSIVE LANGUAGE DISORDER: Primary | ICD-10-CM

## 2025-01-03 DIAGNOSIS — J06.9 VIRAL URI: ICD-10-CM

## 2025-01-03 DIAGNOSIS — F84.0 ACTIVE AUTISTIC DISORDER: ICD-10-CM

## 2025-01-03 DIAGNOSIS — K59.09 INTERMITTENT CONSTIPATION: ICD-10-CM

## 2025-01-03 DIAGNOSIS — Z76.89 ESTABLISHING CARE WITH NEW DOCTOR, ENCOUNTER FOR: ICD-10-CM

## 2025-01-03 PROCEDURE — 99214 OFFICE O/P EST MOD 30 MIN: CPT

## 2025-01-03 ASSESSMENT — PATIENT HEALTH QUESTIONNAIRE - PHQ9: DEPRESSION UNABLE TO ASSESS: FUNCTIONAL CAPACITY MOTIVATION LIMITS ACCURACY

## 2025-01-03 NOTE — PROGRESS NOTES
including the recording.      Reviewed health maintenance, prior labs and imaging.   Patient given educational materials - see patient instructions.    Discussed use, benefit, and side effects of prescribed medications. Barriers to medication compliance addressed.   All patient questions answered.  Pt voiced understanding to plan of care.   Instructed to continue medications as discussed, healthy diet and exercise.    Patient agreed with treatment plan. Follow up as directed below.     This note is created with the assistance of a speech-recognition program. While intending to generate a document that actually reflects the content of the visit, no guarantees can be provided that every mistake has been identified and corrected by editing.    Electronically signed by AMY Sahni CNP, APRN-CNP on 1/9/2025 at 3:50 PM

## 2025-05-01 ENCOUNTER — TELEPHONE (OUTPATIENT)
Dept: FAMILY MEDICINE CLINIC | Age: 25
End: 2025-05-01

## 2025-05-01 NOTE — TELEPHONE ENCOUNTER
The patients mother is requesting a letter with the patients diagnosis added  to qualify for a speech therapist  device for communication to be sent for him to qualify .  Active autistic disorder,developmental delay, expressive language  disorder .